# Patient Record
Sex: MALE | Race: WHITE | Employment: FULL TIME | URBAN - METROPOLITAN AREA
[De-identification: names, ages, dates, MRNs, and addresses within clinical notes are randomized per-mention and may not be internally consistent; named-entity substitution may affect disease eponyms.]

---

## 2017-05-11 PROBLEM — Z96.642 HISTORY OF LEFT HIP REPLACEMENT: Status: ACTIVE | Noted: 2017-05-11

## 2017-05-11 PROBLEM — M70.62 TROCHANTERIC BURSITIS, LEFT HIP: Status: ACTIVE | Noted: 2017-05-11

## 2017-06-08 PROBLEM — M25.552 LEFT HIP PAIN: Status: ACTIVE | Noted: 2017-06-08

## 2017-09-01 ENCOUNTER — APPOINTMENT (OUTPATIENT)
Dept: LAB | Facility: HOSPITAL | Age: 57
End: 2017-09-01
Attending: ORTHOPAEDIC SURGERY
Payer: COMMERCIAL

## 2017-09-01 DIAGNOSIS — Z96.642 HISTORY OF LEFT HIP REPLACEMENT: ICD-10-CM

## 2017-09-01 LAB
C-REACTIVE PROTEIN: <0.29 MG/DL (ref ?–1)
SED RATE-ML: 6 MM/HR (ref 0–12)

## 2017-09-01 PROCEDURE — 83015 HEAVY METAL QUAL ANY NUMBER: CPT

## 2017-09-01 PROCEDURE — 86140 C-REACTIVE PROTEIN: CPT

## 2017-09-01 PROCEDURE — 82495 ASSAY OF CHROMIUM: CPT

## 2017-09-01 PROCEDURE — 36415 COLL VENOUS BLD VENIPUNCTURE: CPT

## 2017-09-01 PROCEDURE — 85652 RBC SED RATE AUTOMATED: CPT

## 2017-09-03 LAB — CHROMIUM, SERUM: 3.1 UG/L

## 2017-09-04 LAB — COBALT, SERUM OR PLASMA: 4.1 UG/L

## 2017-09-26 PROBLEM — T84.091A: Status: ACTIVE | Noted: 2017-09-26

## 2017-11-22 RX ORDER — MULTIVITAMIN
1 TABLET ORAL DAILY
Status: ON HOLD | COMMUNITY
End: 2018-01-08

## 2017-12-11 PROBLEM — Z86.39 HISTORY OF ENDOGENOUS HYPERTRIGLYCERIDEMIA: Status: ACTIVE | Noted: 2017-12-11

## 2017-12-19 ENCOUNTER — LABORATORY ENCOUNTER (OUTPATIENT)
Dept: LAB | Facility: HOSPITAL | Age: 57
End: 2017-12-19
Payer: COMMERCIAL

## 2017-12-19 ENCOUNTER — APPOINTMENT (OUTPATIENT)
Dept: LAB | Facility: HOSPITAL | Age: 57
End: 2017-12-19
Payer: COMMERCIAL

## 2017-12-19 ENCOUNTER — HOSPITAL ENCOUNTER (OUTPATIENT)
Dept: PHYSICAL THERAPY | Facility: HOSPITAL | Age: 57
Discharge: HOME | End: 2017-12-19
Attending: ORTHOPAEDIC SURGERY
Payer: COMMERCIAL

## 2017-12-19 DIAGNOSIS — Z12.5 SCREENING PSA (PROSTATE SPECIFIC ANTIGEN): ICD-10-CM

## 2017-12-19 DIAGNOSIS — T84.018A FAILED TOTAL HIP ARTHROPLASTY (HCC): ICD-10-CM

## 2017-12-19 DIAGNOSIS — I10 ESSENTIAL HYPERTENSION: ICD-10-CM

## 2017-12-19 DIAGNOSIS — Z96.649 FAILED TOTAL HIP ARTHROPLASTY (HCC): ICD-10-CM

## 2017-12-19 DIAGNOSIS — R73.01 IFG (IMPAIRED FASTING GLUCOSE): ICD-10-CM

## 2017-12-19 DIAGNOSIS — Z86.39 HISTORY OF ENDOGENOUS HYPERTRIGLYCERIDEMIA: ICD-10-CM

## 2017-12-19 PROCEDURE — 85730 THROMBOPLASTIN TIME PARTIAL: CPT

## 2017-12-19 PROCEDURE — 81003 URINALYSIS AUTO W/O SCOPE: CPT

## 2017-12-19 PROCEDURE — 87081 CULTURE SCREEN ONLY: CPT

## 2017-12-19 PROCEDURE — 85610 PROTHROMBIN TIME: CPT

## 2017-12-19 PROCEDURE — 93010 ELECTROCARDIOGRAM REPORT: CPT | Performed by: INTERNAL MEDICINE

## 2017-12-19 PROCEDURE — 83036 HEMOGLOBIN GLYCOSYLATED A1C: CPT

## 2017-12-19 PROCEDURE — 36415 COLL VENOUS BLD VENIPUNCTURE: CPT

## 2017-12-19 PROCEDURE — 80061 LIPID PANEL: CPT

## 2017-12-19 PROCEDURE — 84153 ASSAY OF PSA TOTAL: CPT

## 2017-12-19 PROCEDURE — 86901 BLOOD TYPING SEROLOGIC RH(D): CPT

## 2017-12-19 PROCEDURE — 85025 COMPLETE CBC W/AUTO DIFF WBC: CPT

## 2017-12-19 PROCEDURE — 86900 BLOOD TYPING SEROLOGIC ABO: CPT

## 2017-12-19 PROCEDURE — 84550 ASSAY OF BLOOD/URIC ACID: CPT

## 2017-12-19 PROCEDURE — 86850 RBC ANTIBODY SCREEN: CPT

## 2017-12-19 PROCEDURE — 80053 COMPREHEN METABOLIC PANEL: CPT

## 2017-12-19 PROCEDURE — 93005 ELECTROCARDIOGRAM TRACING: CPT

## 2017-12-21 ENCOUNTER — ANESTHESIA EVENT (OUTPATIENT)
Dept: SURGERY | Facility: HOSPITAL | Age: 57
DRG: 468 | End: 2017-12-21
Payer: COMMERCIAL

## 2017-12-21 NOTE — PROGRESS NOTES
Results released to [facility] with Dr. Sparks Home notes  Pt sent [facility] message of Dr. Sparks Home notes

## 2018-01-08 ENCOUNTER — APPOINTMENT (OUTPATIENT)
Dept: GENERAL RADIOLOGY | Facility: HOSPITAL | Age: 58
DRG: 468 | End: 2018-01-08
Attending: PHYSICIAN ASSISTANT
Payer: COMMERCIAL

## 2018-01-08 ENCOUNTER — APPOINTMENT (OUTPATIENT)
Dept: GENERAL RADIOLOGY | Facility: HOSPITAL | Age: 58
DRG: 468 | End: 2018-01-08
Attending: ORTHOPAEDIC SURGERY
Payer: COMMERCIAL

## 2018-01-08 ENCOUNTER — ANESTHESIA (OUTPATIENT)
Dept: SURGERY | Facility: HOSPITAL | Age: 58
DRG: 468 | End: 2018-01-08
Payer: COMMERCIAL

## 2018-01-08 ENCOUNTER — SURGERY (OUTPATIENT)
Age: 58
End: 2018-01-08

## 2018-01-08 ENCOUNTER — HOSPITAL ENCOUNTER (INPATIENT)
Facility: HOSPITAL | Age: 58
LOS: 2 days | Discharge: HOME | DRG: 468 | End: 2018-01-10
Attending: ORTHOPAEDIC SURGERY | Admitting: ORTHOPAEDIC SURGERY
Payer: COMMERCIAL

## 2018-01-08 DIAGNOSIS — T84.018A FAILED TOTAL HIP ARTHROPLASTY (HCC): Primary | ICD-10-CM

## 2018-01-08 DIAGNOSIS — Z96.649 FAILED TOTAL HIP ARTHROPLASTY (HCC): Primary | ICD-10-CM

## 2018-01-08 PROCEDURE — 73501 X-RAY EXAM HIP UNI 1 VIEW: CPT | Performed by: ORTHOPAEDIC SURGERY

## 2018-01-08 PROCEDURE — 73501 X-RAY EXAM HIP UNI 1 VIEW: CPT | Performed by: PHYSICIAN ASSISTANT

## 2018-01-08 PROCEDURE — 88300 SURGICAL PATH GROSS: CPT | Performed by: ORTHOPAEDIC SURGERY

## 2018-01-08 PROCEDURE — 0SPB09Z REMOVAL OF LINER FROM LEFT HIP JOINT, OPEN APPROACH: ICD-10-PCS | Performed by: ORTHOPAEDIC SURGERY

## 2018-01-08 PROCEDURE — 0SRB04A REPLACEMENT OF LEFT HIP JOINT WITH CERAMIC ON POLYETHYLENE SYNTHETIC SUBSTITUTE, UNCEMENTED, OPEN APPROACH: ICD-10-PCS | Performed by: ORTHOPAEDIC SURGERY

## 2018-01-08 PROCEDURE — 88305 TISSUE EXAM BY PATHOLOGIST: CPT | Performed by: ORTHOPAEDIC SURGERY

## 2018-01-08 PROCEDURE — 3E0T3BZ INTRODUCTION OF ANESTHETIC AGENT INTO PERIPHERAL NERVES AND PLEXI, PERCUTANEOUS APPROACH: ICD-10-PCS | Performed by: ANESTHESIOLOGY

## 2018-01-08 PROCEDURE — 0SUE09Z SUPPLEMENT LEFT HIP JOINT, ACETABULAR SURFACE WITH LINER, OPEN APPROACH: ICD-10-PCS | Performed by: ORTHOPAEDIC SURGERY

## 2018-01-08 PROCEDURE — 76942 ECHO GUIDE FOR BIOPSY: CPT | Performed by: ORTHOPAEDIC SURGERY

## 2018-01-08 DEVICE — WAGNER CONE PROSTHESIS 135°, Ø 18
Type: IMPLANTABLE DEVICE | Site: HIP | Status: FUNCTIONAL
Brand: WAGNER CONE PROSTHESIS®

## 2018-01-08 DEVICE — BIOLOX® DELTA, CERAMIC FEMORAL HEAD, S, Ø 36/-3.5, TAPER 12/14
Type: IMPLANTABLE DEVICE | Site: HIP | Status: FUNCTIONAL
Brand: BIOLOX® DELTA

## 2018-01-08 DEVICE — IMPLANTABLE DEVICE: Type: IMPLANTABLE DEVICE | Site: HIP | Status: FUNCTIONAL

## 2018-01-08 DEVICE — CONT MULTI SHELL 56 KK: Type: IMPLANTABLE DEVICE | Site: HIP | Status: FUNCTIONAL

## 2018-01-08 RX ORDER — METOCLOPRAMIDE HYDROCHLORIDE 5 MG/ML
10 INJECTION INTRAMUSCULAR; INTRAVENOUS AS NEEDED
Status: DISCONTINUED | OUTPATIENT
Start: 2018-01-08 | End: 2018-01-08 | Stop reason: HOSPADM

## 2018-01-08 RX ORDER — BISACODYL 10 MG
10 SUPPOSITORY, RECTAL RECTAL
Status: DISCONTINUED | OUTPATIENT
Start: 2018-01-08 | End: 2018-01-10

## 2018-01-08 RX ORDER — HYDROCODONE BITARTRATE AND ACETAMINOPHEN 5; 325 MG/1; MG/1
1 TABLET ORAL AS NEEDED
Status: DISCONTINUED | OUTPATIENT
Start: 2018-01-08 | End: 2018-01-08 | Stop reason: HOSPADM

## 2018-01-08 RX ORDER — OXYCODONE HCL 10 MG/1
10 TABLET, FILM COATED, EXTENDED RELEASE ORAL
Status: DISCONTINUED | OUTPATIENT
Start: 2018-01-08 | End: 2018-01-09

## 2018-01-08 RX ORDER — METOCLOPRAMIDE HYDROCHLORIDE 5 MG/ML
10 INJECTION INTRAMUSCULAR; INTRAVENOUS EVERY 6 HOURS PRN
Status: DISCONTINUED | OUTPATIENT
Start: 2018-01-08 | End: 2018-01-10

## 2018-01-08 RX ORDER — DOCUSATE SODIUM 100 MG/1
100 CAPSULE, LIQUID FILLED ORAL 2 TIMES DAILY
Qty: 60 CAPSULE | Refills: 0 | Status: SHIPPED | OUTPATIENT
Start: 2018-01-08 | End: 2018-02-23 | Stop reason: ALTCHOICE

## 2018-01-08 RX ORDER — KETOROLAC TROMETHAMINE 30 MG/ML
15 INJECTION, SOLUTION INTRAMUSCULAR; INTRAVENOUS EVERY 6 HOURS
Status: COMPLETED | OUTPATIENT
Start: 2018-01-08 | End: 2018-01-09

## 2018-01-08 RX ORDER — DOCUSATE SODIUM 100 MG/1
100 CAPSULE, LIQUID FILLED ORAL 2 TIMES DAILY
Status: DISCONTINUED | OUTPATIENT
Start: 2018-01-08 | End: 2018-01-10

## 2018-01-08 RX ORDER — CEFAZOLIN SODIUM 1 G/3ML
INJECTION, POWDER, FOR SOLUTION INTRAMUSCULAR; INTRAVENOUS
Status: DISCONTINUED | OUTPATIENT
Start: 2018-01-08 | End: 2018-01-08 | Stop reason: HOSPADM

## 2018-01-08 RX ORDER — MIDAZOLAM HYDROCHLORIDE 1 MG/ML
1 INJECTION INTRAMUSCULAR; INTRAVENOUS EVERY 5 MIN PRN
Status: DISCONTINUED | OUTPATIENT
Start: 2018-01-08 | End: 2018-01-08 | Stop reason: HOSPADM

## 2018-01-08 RX ORDER — ACETAMINOPHEN 325 MG/1
650 TABLET ORAL ONCE
Status: COMPLETED | OUTPATIENT
Start: 2018-01-08 | End: 2018-01-08

## 2018-01-08 RX ORDER — OXYCODONE HYDROCHLORIDE 10 MG/1
10 TABLET ORAL EVERY 4 HOURS PRN
Status: DISCONTINUED | OUTPATIENT
Start: 2018-01-08 | End: 2018-01-09

## 2018-01-08 RX ORDER — HYDROMORPHONE HYDROCHLORIDE 1 MG/ML
0.2 INJECTION, SOLUTION INTRAMUSCULAR; INTRAVENOUS; SUBCUTANEOUS EVERY 2 HOUR PRN
Status: DISCONTINUED | OUTPATIENT
Start: 2018-01-08 | End: 2018-01-10

## 2018-01-08 RX ORDER — IBUPROFEN 800 MG/1
800 TABLET ORAL EVERY 6 HOURS PRN
Status: ON HOLD | COMMUNITY
End: 2018-01-08

## 2018-01-08 RX ORDER — OXYCODONE HCL 10 MG/1
10 TABLET, FILM COATED, EXTENDED RELEASE ORAL
Status: COMPLETED | OUTPATIENT
Start: 2018-01-08 | End: 2018-01-08

## 2018-01-08 RX ORDER — OXYCODONE HYDROCHLORIDE 5 MG/1
5 TABLET ORAL EVERY 4 HOURS PRN
Status: DISCONTINUED | OUTPATIENT
Start: 2018-01-08 | End: 2018-01-09

## 2018-01-08 RX ORDER — HYDROCODONE BITARTRATE AND ACETAMINOPHEN 5; 325 MG/1; MG/1
2 TABLET ORAL AS NEEDED
Status: DISCONTINUED | OUTPATIENT
Start: 2018-01-08 | End: 2018-01-08 | Stop reason: HOSPADM

## 2018-01-08 RX ORDER — DIPHENHYDRAMINE HCL 25 MG
25 CAPSULE ORAL EVERY 4 HOURS PRN
Status: DISCONTINUED | OUTPATIENT
Start: 2018-01-08 | End: 2018-01-10

## 2018-01-08 RX ORDER — CEFAZOLIN SODIUM/WATER 2 G/20 ML
2 SYRINGE (ML) INTRAVENOUS ONCE
Status: DISCONTINUED | OUTPATIENT
Start: 2018-01-08 | End: 2018-01-08 | Stop reason: HOSPADM

## 2018-01-08 RX ORDER — SODIUM CHLORIDE 9 MG/ML
INJECTION, SOLUTION INTRAVENOUS CONTINUOUS
Status: DISCONTINUED | OUTPATIENT
Start: 2018-01-08 | End: 2018-01-10

## 2018-01-08 RX ORDER — HYDROMORPHONE HYDROCHLORIDE 1 MG/ML
0.4 INJECTION, SOLUTION INTRAMUSCULAR; INTRAVENOUS; SUBCUTANEOUS EVERY 5 MIN PRN
Status: DISCONTINUED | OUTPATIENT
Start: 2018-01-08 | End: 2018-01-08 | Stop reason: HOSPADM

## 2018-01-08 RX ORDER — SODIUM CHLORIDE, SODIUM LACTATE, POTASSIUM CHLORIDE, CALCIUM CHLORIDE 600; 310; 30; 20 MG/100ML; MG/100ML; MG/100ML; MG/100ML
INJECTION, SOLUTION INTRAVENOUS CONTINUOUS
Status: DISCONTINUED | OUTPATIENT
Start: 2018-01-08 | End: 2018-01-08

## 2018-01-08 RX ORDER — SODIUM PHOSPHATE, DIBASIC AND SODIUM PHOSPHATE, MONOBASIC 7; 19 G/133ML; G/133ML
1 ENEMA RECTAL ONCE AS NEEDED
Status: DISCONTINUED | OUTPATIENT
Start: 2018-01-08 | End: 2018-01-10

## 2018-01-08 RX ORDER — DIPHENHYDRAMINE HYDROCHLORIDE 50 MG/ML
25 INJECTION INTRAMUSCULAR; INTRAVENOUS ONCE AS NEEDED
Status: ACTIVE | OUTPATIENT
Start: 2018-01-08 | End: 2018-01-08

## 2018-01-08 RX ORDER — CEFAZOLIN SODIUM/WATER 2 G/20 ML
2 SYRINGE (ML) INTRAVENOUS EVERY 8 HOURS
Status: COMPLETED | OUTPATIENT
Start: 2018-01-08 | End: 2018-01-09

## 2018-01-08 RX ORDER — ONDANSETRON 2 MG/ML
4 INJECTION INTRAMUSCULAR; INTRAVENOUS AS NEEDED
Status: DISCONTINUED | OUTPATIENT
Start: 2018-01-08 | End: 2018-01-08 | Stop reason: HOSPADM

## 2018-01-08 RX ORDER — CYCLOBENZAPRINE HCL 5 MG
5 TABLET ORAL 3 TIMES DAILY PRN
Status: DISCONTINUED | OUTPATIENT
Start: 2018-01-08 | End: 2018-01-10

## 2018-01-08 RX ORDER — DIPHENHYDRAMINE HYDROCHLORIDE 50 MG/ML
12.5 INJECTION INTRAMUSCULAR; INTRAVENOUS EVERY 4 HOURS PRN
Status: DISCONTINUED | OUTPATIENT
Start: 2018-01-08 | End: 2018-01-10

## 2018-01-08 RX ORDER — HYDROMORPHONE HYDROCHLORIDE 1 MG/ML
0.8 INJECTION, SOLUTION INTRAMUSCULAR; INTRAVENOUS; SUBCUTANEOUS EVERY 2 HOUR PRN
Status: DISCONTINUED | OUTPATIENT
Start: 2018-01-08 | End: 2018-01-10

## 2018-01-08 RX ORDER — ONDANSETRON 2 MG/ML
4 INJECTION INTRAMUSCULAR; INTRAVENOUS EVERY 4 HOURS PRN
Status: DISCONTINUED | OUTPATIENT
Start: 2018-01-08 | End: 2018-01-10

## 2018-01-08 RX ORDER — SENNOSIDES 8.6 MG
17.2 TABLET ORAL NIGHTLY
Status: DISCONTINUED | OUTPATIENT
Start: 2018-01-08 | End: 2018-01-10

## 2018-01-08 RX ORDER — ALBUTEROL SULFATE 2.5 MG/3ML
2.5 SOLUTION RESPIRATORY (INHALATION) ONCE AS NEEDED
Status: DISCONTINUED | OUTPATIENT
Start: 2018-01-08 | End: 2018-01-08 | Stop reason: HOSPADM

## 2018-01-08 RX ORDER — HYDROMORPHONE HYDROCHLORIDE 1 MG/ML
0.4 INJECTION, SOLUTION INTRAMUSCULAR; INTRAVENOUS; SUBCUTANEOUS EVERY 2 HOUR PRN
Status: DISCONTINUED | OUTPATIENT
Start: 2018-01-08 | End: 2018-01-10

## 2018-01-08 RX ORDER — SCOLOPAMINE TRANSDERMAL SYSTEM 1 MG/1
1 PATCH, EXTENDED RELEASE TRANSDERMAL ONCE
Status: DISCONTINUED | OUTPATIENT
Start: 2018-01-08 | End: 2018-01-10

## 2018-01-08 RX ORDER — OXYCODONE HYDROCHLORIDE 15 MG/1
15 TABLET ORAL EVERY 4 HOURS PRN
Status: DISCONTINUED | OUTPATIENT
Start: 2018-01-08 | End: 2018-01-09

## 2018-01-08 RX ORDER — ACETAMINOPHEN 325 MG/1
650 TABLET ORAL 4 TIMES DAILY
Status: DISCONTINUED | OUTPATIENT
Start: 2018-01-08 | End: 2018-01-09

## 2018-01-08 RX ORDER — TRAMADOL HYDROCHLORIDE 50 MG/1
50 TABLET ORAL EVERY 6 HOURS
Status: DISCONTINUED | OUTPATIENT
Start: 2018-01-08 | End: 2018-01-09

## 2018-01-08 RX ORDER — POLYETHYLENE GLYCOL 3350 17 G/17G
17 POWDER, FOR SOLUTION ORAL DAILY PRN
Status: DISCONTINUED | OUTPATIENT
Start: 2018-01-08 | End: 2018-01-10

## 2018-01-08 RX ORDER — NALOXONE HYDROCHLORIDE 0.4 MG/ML
80 INJECTION, SOLUTION INTRAMUSCULAR; INTRAVENOUS; SUBCUTANEOUS AS NEEDED
Status: DISCONTINUED | OUTPATIENT
Start: 2018-01-08 | End: 2018-01-08 | Stop reason: HOSPADM

## 2018-01-08 RX ORDER — MEPERIDINE HYDROCHLORIDE 25 MG/ML
12.5 INJECTION INTRAMUSCULAR; INTRAVENOUS; SUBCUTANEOUS AS NEEDED
Status: DISCONTINUED | OUTPATIENT
Start: 2018-01-08 | End: 2018-01-08 | Stop reason: HOSPADM

## 2018-01-08 RX ORDER — ACETAMINOPHEN 325 MG/1
TABLET ORAL
Status: COMPLETED
Start: 2018-01-08 | End: 2018-01-08

## 2018-01-08 RX ORDER — SODIUM CHLORIDE, SODIUM LACTATE, POTASSIUM CHLORIDE, CALCIUM CHLORIDE 600; 310; 30; 20 MG/100ML; MG/100ML; MG/100ML; MG/100ML
INJECTION, SOLUTION INTRAVENOUS CONTINUOUS
Status: DISCONTINUED | OUTPATIENT
Start: 2018-01-08 | End: 2018-01-08 | Stop reason: HOSPADM

## 2018-01-08 RX ORDER — HYDROCODONE BITARTRATE AND ACETAMINOPHEN 10; 325 MG/1; MG/1
1 TABLET ORAL EVERY 4 HOURS PRN
Qty: 80 TABLET | Refills: 0 | Status: SHIPPED | OUTPATIENT
Start: 2018-01-08 | End: 2018-02-23 | Stop reason: ALTCHOICE

## 2018-01-08 NOTE — ANESTHESIA PREPROCEDURE EVALUATION
PRE-OP EVALUATION    Patient Name: Bell Jones    Pre-op Diagnosis: LEFT HIP FAILED HIP REPLACEMENT      Procedure(s):  LEFT REVISION TOTAL HIP REPLACEMENT      Surgeon(s) and Role:     Donya Plascencia MD - Primary    Pre-op vitals reviewed.   Temp: Cardiovascular  Comment: Sinus bradycardia  Incomplete right bundle branch block  Minimal voltage criteria for LVH, may be normal variant  Borderline ECG  No previous ECGs available          Exercise tolerance: good     MET: >4 Dental  Comment: Dentition is grossly intact; Patient does not demonstrate loose teeth to inspection. No notable dental history. Pulmonary    Pulmonary exam normal.  Breath sounds clear to auscultation bilaterally.                Other findings

## 2018-01-08 NOTE — BRIEF OP NOTE
Pre-Operative Diagnosis: LEFT HIP FAILED HIP RESURFACING      Post-Operative Diagnosis: SAME      Procedure Performed:   Procedure(s):  LEFT REVISION TOTAL HIP REPLACEMENT      Surgeon(s) and Role:     Jennifer Oreilly MD - Primary    Assistant(s):  Alan Delcid

## 2018-01-08 NOTE — ANESTHESIA POSTPROCEDURE EVALUATION
[facility] Patient Status:  Surgery Admit   Age/Gender 61year old male MRN [id number]   Location [address] [facility] Attending Jay Valdivia MD   Hosp Day # 0 PCP Cheryl Gay MD       Anesthesia Post-o

## 2018-01-08 NOTE — H&P
Yissel Farrell   12/11/2017 8:45 AM   Office Visit   MRN:  [id number]   Description: 61year old male Provider: Robyn Balderrama MD Department: [facility]   Scanning Cover Sheet     Click to print Nadja Circe 649 for scanning Ibuprofen (MOTRIN OR)   Disp:  Rfl:        No Known Allergies        Past Medical History:   Diagnosis Date   • Anesthesia complication       2 hours after surgery, lost consciousness  per pt secondary to hypovolemia?    • Essential hypertension     • Hyper HEMATOLOGY: denies hx anemia, easy bruising/bleeding or clotting disorder  ENDOCRINE: +cold intolerance, denies increased thirst/hunger/sweating or urination     EXAM:   /66   Pulse 76   Temp (!) 97 °F (36.1 °C) (Tympanic)   Resp 16   Ht 68\"   Wt 18 -     URINALYSIS WITH CULTURE REFLEX; Future  B-blocker added for perioperative cardioprotection        Screening PSA (prostate specific antigen)  -     PSA; Future     History of endogenous hypertriglyceridemia  -     LIPID PANEL;  Future     IFG (impaired · You may be told to take antibiotics just before surgery to prevent infection. If so, follow instructions carefully on how to take them.   · If you are told to take blood thinners to help prevent blood clots after surgery, be sure to follow the instruction Electronically signed by Justina Oseguera          Instructions         Return if symptoms worsen or fail to improve.    Patient Instructions                After Visit Summary (Printed 12/11/2017)   Additional Documentation     Vitals:    /66    Pulse

## 2018-01-09 PROBLEM — Z96.649 FAILED TOTAL HIP ARTHROPLASTY (HCC): Status: ACTIVE | Noted: 2017-09-26

## 2018-01-09 PROBLEM — T84.018A FAILED TOTAL HIP ARTHROPLASTY (HCC): Status: ACTIVE | Noted: 2017-09-26

## 2018-01-09 LAB
BUN BLD-MCNC: 13 MG/DL (ref 8–20)
CALCIUM BLD-MCNC: 8.3 MG/DL (ref 8.3–10.3)
CHLORIDE: 104 MMOL/L (ref 101–111)
CO2: 27 MMOL/L (ref 22–32)
CREAT BLD-MCNC: 1.01 MG/DL (ref 0.7–1.3)
ERYTHROCYTE [DISTWIDTH] IN BLOOD BY AUTOMATED COUNT: 12.5 % (ref 11.5–16)
GLUCOSE BLD-MCNC: 103 MG/DL (ref 70–99)
HCT VFR BLD AUTO: 32.7 % (ref 37–53)
HGB BLD-MCNC: 11 G/DL (ref 13–17)
MCH RBC QN AUTO: 30.9 PG (ref 27–33.2)
MCHC RBC AUTO-ENTMCNC: 33.6 G/DL (ref 31–37)
MCV RBC AUTO: 91.9 FL (ref 80–99)
PLATELET # BLD AUTO: 196 10(3)UL (ref 150–450)
POTASSIUM SERPL-SCNC: 4.3 MMOL/L (ref 3.6–5.1)
RBC # BLD AUTO: 3.56 X10(6)UL (ref 4.3–5.7)
RED CELL DISTRIBUTION WIDTH-SD: 42.4 FL (ref 35.1–46.3)
SODIUM SERPL-SCNC: 138 MMOL/L (ref 136–144)
WBC # BLD AUTO: 10.3 X10(3) UL (ref 4–13)

## 2018-01-09 PROCEDURE — 97535 SELF CARE MNGMENT TRAINING: CPT

## 2018-01-09 PROCEDURE — 80048 BASIC METABOLIC PNL TOTAL CA: CPT | Performed by: HOSPITALIST

## 2018-01-09 PROCEDURE — 97165 OT EVAL LOW COMPLEX 30 MIN: CPT

## 2018-01-09 PROCEDURE — 85027 COMPLETE CBC AUTOMATED: CPT | Performed by: ORTHOPAEDIC SURGERY

## 2018-01-09 PROCEDURE — 97116 GAIT TRAINING THERAPY: CPT

## 2018-01-09 PROCEDURE — 97150 GROUP THERAPEUTIC PROCEDURES: CPT

## 2018-01-09 PROCEDURE — 97161 PT EVAL LOW COMPLEX 20 MIN: CPT

## 2018-01-09 RX ORDER — ATENOLOL 25 MG/1
25 TABLET ORAL DAILY
Status: DISCONTINUED | OUTPATIENT
Start: 2018-01-09 | End: 2018-01-10

## 2018-01-09 RX ORDER — HYDROCODONE BITARTRATE AND ACETAMINOPHEN 10; 325 MG/1; MG/1
2 TABLET ORAL EVERY 4 HOURS PRN
Status: DISCONTINUED | OUTPATIENT
Start: 2018-01-09 | End: 2018-01-10

## 2018-01-09 RX ORDER — NALOXONE HYDROCHLORIDE 4 MG/.1ML
4 SPRAY, METERED NASAL AS NEEDED
Qty: 1 EACH | Refills: 0 | Status: SHIPPED | OUTPATIENT
Start: 2018-01-09 | End: 2018-02-23 | Stop reason: ALTCHOICE

## 2018-01-09 RX ORDER — CALCIUM CARBONATE 200(500)MG
500 TABLET,CHEWABLE ORAL 3 TIMES DAILY PRN
Status: DISCONTINUED | OUTPATIENT
Start: 2018-01-09 | End: 2018-01-10

## 2018-01-09 RX ORDER — HYDROCODONE BITARTRATE AND ACETAMINOPHEN 10; 325 MG/1; MG/1
1 TABLET ORAL EVERY 4 HOURS PRN
Status: DISCONTINUED | OUTPATIENT
Start: 2018-01-09 | End: 2018-01-10

## 2018-01-09 RX ORDER — ACETAMINOPHEN 325 MG/1
650 TABLET ORAL EVERY 4 HOURS PRN
Status: DISCONTINUED | OUTPATIENT
Start: 2018-01-09 | End: 2018-01-10

## 2018-01-09 RX ORDER — LISINOPRIL 10 MG/1
10 TABLET ORAL DAILY
Status: DISCONTINUED | OUTPATIENT
Start: 2018-01-09 | End: 2018-01-10

## 2018-01-09 RX ORDER — TRAMADOL HYDROCHLORIDE 50 MG/1
50 TABLET ORAL EVERY 6 HOURS PRN
Status: DISCONTINUED | OUTPATIENT
Start: 2018-01-09 | End: 2018-01-10

## 2018-01-09 RX ORDER — CELECOXIB 200 MG/1
200 CAPSULE ORAL 2 TIMES DAILY
Status: COMPLETED | OUTPATIENT
Start: 2018-01-09 | End: 2018-01-10

## 2018-01-09 NOTE — CONSULTS
General Medicine Consult      Reason for consult: medical management    Consulted by: Dr. Mcguire Current    PCP: Dioni Mann MD      History of Present Illness: Pt is a 60 yo with HTN, OA, who is consulted for medical management s/p L revision THR.   No post op HCl  50 mg Oral Q6H   • acetaminophen  650 mg Oral QID   • OxyCODONE HCl ER  10 mg Oral [email or web address]   • ketorolac (TORADOL) injection  15 mg Intravenous Q6H   • Senna  17.2 mg Oral Nightly   • docusate sodium  100 mg Oral BID   • rivaroxaban  10 mg Oral effort   Chest wall:  No tenderness or deformity. Heart:  Regular rate and rhythm, S1, S2 normal,  no LE edema   Abdomen:   Soft, non-tender.  Bowel sounds normal. Non distended, no overt hernias   Extremities: Extremities with no cyanosis   Skin: Skin co

## 2018-01-09 NOTE — CM/SW NOTE
Pt confirmed he will discharge to home, plans to go to outpatient therapy at [facility] in [facility]. No other needs.

## 2018-01-09 NOTE — PHYSICAL THERAPY NOTE
PHYSICAL THERAPY HIP TREATMENT NOTE - INPATIENT      Room Number: 652/390-V     Session: 1&2  Number of Visits to Meet Established Goals: 3    Presenting Problem: s/p left ROBERTA revision 1/8/18     History related to current admission: pt admitted 1/9/18 for sitting on the side of the bed?: None   How much help from another person does the patient currently need. ..   -   Moving to and from a bed to a chair (including a wheelchair)?: None   -   Need to walk in hospital room?: None   -   Climbing 3-5 steps with Session PM Session   Ankle Pumps     10 reps 15 reps   Quad Sets 10 reps 15 reps   Glut Sets 10 reps 15 reps   Hip Abd/Add 10 reps 15 reps   Heel slides 10 reps 15 reps   Saq 10 reps 15 reps   Sitting Knee Extension 10 reps 15 reps   Standing heel/toe rais Patient verbalizes and/or demonstrates all precautions and safety concerns independently   Goal #6        Goal Comments: Goals established on 1/9/2018. Ongoing 1/9/18.

## 2018-01-09 NOTE — PHYSICAL THERAPY NOTE
PHYSICAL THERAPY HIP EVALUATION - INPATIENT     Room Number: 351/351-A  Evaluation Date: 1/9/2018  Type of Evaluation: Initial  Physician Order: PT Eval and Treat    Presenting Problem: s/p left ROBERTA revision 1/8/18  Reason for Therapy: Mobility Dysfunction Activity promotion    COGNITION  · Overall Cognitive Status:  WFL - within functional limits    RANGE OF MOTION AND STRENGTH ASSESSMENT  Upper extremity ROM and strength are within functional limits     Lower extremity ROM is within functional limits excep demonstration of rw use, stood to rw with supervision. Pt able to perform left tke, marching in place with no left knee buckling, instructed in gait sequencing. Pt amb with rw with gait training emphasis on heel toe pattern, upright posture.  Pt does tend Home;Outpatient PT    PLAN  PT Treatment Plan: Bed mobility; Endurance; Patient education; Energy conservation;Gait training;Strengthening;Range of motion;Stoop training;Stair training;Transfer training  Rehab Potential : Good  Frequency (Obs): BID  Number of

## 2018-01-09 NOTE — PROGRESS NOTES
Brief Internal Medicine Note    Full Note to Follow      Pt is a 62 yo with HTN, OA, who is consulted for medical management s/p L revision THR. No post op complications noted. No cp/sob/n/v/f/c. +U, +flatus.   Some LH upon standing but resolves fast.

## 2018-01-09 NOTE — OCCUPATIONAL THERAPY NOTE
OCCUPATIONAL THERAPY QUICK EVALUATION - INPATIENT    Room Number: 351/351-A  Evaluation Date: 1/9/2018     Type of Evaluation: Quick Eval  Presenting Problem: s/p L ROBERTA revision 1/8/18    Physician Order: IP Consult to Occupational Therapy  Reason for Ther BEARING RESTRICTION  Weight Bearing Restriction: L lower extremity           L Lower Extremity: Weight Bearing as Tolerated    PAIN ASSESSMENT  Ratin  Location: L hip  Management Techniques: Activity promotion; Body mechanics;Breathing techniques;Relaxa dressing Mod I. Patient required minimal cueing for hand placement during transfers throughout. Patient also educated on OT role, safety, fall prevention, pain management, shower transfers with good verbal understanding.      Patient End of Session: Up in c home  Patient able to dress lower extremities:  At supervision level or above; patient reports will have supervision at home  Patient/Caregiver able to demonstrate safety with ADLS: At supervision level or above; patient reports will have supervision at [facility]

## 2018-01-09 NOTE — PROGRESS NOTES
[facility] Patient Status:  Inpatient    1960 MRN [id number]   [facility] 3SW-A Attending Alok Jorgensen MD   Hosp Day # 1 PCP Adrienne Castillo MD         S:  Patient doing well. No nausea.   No SOB, CP or reina

## 2018-01-09 NOTE — OPERATIVE REPORT
[facility]    PATIENT'S NAME: Fiorella Nowak   ATTENDING PHYSICIAN: Adrián Yeager M.D. OPERATING PHYSICIAN: Adrián Yeager M.D.    PATIENT ACCOUNT#:   [de-identified]    LOCATION:  PACU [facility] PACU 3 EDWP 10  MEDICAL RECORD #:   [id number]       DATE OF BIRTH padding was also applied. Using the old incision, I used the old incision in the inferior half, and then I did a step-cut anteriorly, since my approach was going to be a lateral approach. Sharp dissection through the fascial layer was made.   Gluteus medi and thick and healthy. Medial wall was slightly compromised and soft, but it was still intact. Roof was intact. I could put my finger along the iliopsoas area, and there was a lining and a space. There did not return any significant amount of fluid.   A Subcutaneous layer was closed in multiple layers. Skin was closed with staples. Sterile dressing was applied. All counts were correct.     Dictated By Sheldon Charles M.D.  d: 01/08/2018 15:00:10  t: 01/08/2018 15:36:09  [facility] [id number]  /

## 2018-01-10 VITALS
HEIGHT: 67 IN | WEIGHT: 180.75 LBS | OXYGEN SATURATION: 96 % | HEART RATE: 62 BPM | RESPIRATION RATE: 18 BRPM | BODY MASS INDEX: 28.37 KG/M2 | TEMPERATURE: 98 F | DIASTOLIC BLOOD PRESSURE: 62 MMHG | SYSTOLIC BLOOD PRESSURE: 121 MMHG

## 2018-01-10 LAB
BASOPHILS # BLD AUTO: 0.03 X10(3) UL (ref 0–0.1)
BASOPHILS NFR BLD AUTO: 0.3 %
EOSINOPHIL # BLD AUTO: 0.13 X10(3) UL (ref 0–0.3)
EOSINOPHIL NFR BLD AUTO: 1.5 %
ERYTHROCYTE [DISTWIDTH] IN BLOOD BY AUTOMATED COUNT: 12.6 % (ref 11.5–16)
HCT VFR BLD AUTO: 29.6 % (ref 37–53)
HGB BLD-MCNC: 10.3 G/DL (ref 13–17)
IMMATURE GRANULOCYTE COUNT: 0.03 X10(3) UL (ref 0–1)
IMMATURE GRANULOCYTE RATIO %: 0.3 %
LYMPHOCYTES # BLD AUTO: 1.87 X10(3) UL (ref 0.9–4)
LYMPHOCYTES NFR BLD AUTO: 21 %
MCH RBC QN AUTO: 31.1 PG (ref 27–33.2)
MCHC RBC AUTO-ENTMCNC: 34.8 G/DL (ref 31–37)
MCV RBC AUTO: 89.4 FL (ref 80–99)
MONOCYTES # BLD AUTO: 0.86 X10(3) UL (ref 0.1–0.6)
MONOCYTES NFR BLD AUTO: 9.6 %
NEUTROPHIL ABS PRELIM: 6 X10 (3) UL (ref 1.3–6.7)
NEUTROPHILS # BLD AUTO: 6 X10(3) UL (ref 1.3–6.7)
NEUTROPHILS NFR BLD AUTO: 67.3 %
PLATELET # BLD AUTO: 188 10(3)UL (ref 150–450)
RBC # BLD AUTO: 3.31 X10(6)UL (ref 4.3–5.7)
RED CELL DISTRIBUTION WIDTH-SD: 41.6 FL (ref 35.1–46.3)
WBC # BLD AUTO: 8.9 X10(3) UL (ref 4–13)

## 2018-01-10 PROCEDURE — 85025 COMPLETE CBC W/AUTO DIFF WBC: CPT | Performed by: HOSPITALIST

## 2018-01-10 PROCEDURE — 97116 GAIT TRAINING THERAPY: CPT

## 2018-01-10 PROCEDURE — 97150 GROUP THERAPEUTIC PROCEDURES: CPT

## 2018-01-10 NOTE — PROGRESS NOTES
Orthopedic surgery progress note    Kateryna Ronquillo Patient Status:  Inpatient    1960 MRN [id number]   [facility] 3SW-A Attending China Rodriguez MD   Hosp Day # 2 PCP Rao Swain MD       Subjective:  No major complaints.   No

## 2018-01-10 NOTE — CM/SW NOTE
01/10/18 1315   Discharge disposition   Discharged to: Home or Self   Outpatient services Physical therapy   Discharge transportation Private car

## 2018-01-10 NOTE — PROGRESS NOTES
Acute Pain Service    Post Op Day 2 Ortho Note    Assessed patient in chair. Patient states Mikey Bray is working well to manage pain; denies itching/nausea/dizziness. Patient able to bear weight on sx leg; equal sensation in BLE. No further recommendations.

## 2018-01-10 NOTE — PHYSICAL THERAPY NOTE
PHYSICAL THERAPY HIP TREATMENT NOTE - INPATIENT      Room Number: 200/587-B     Session: 3  Number of Visits to Meet Established Goals: 3    Presenting Problem: s/p left ROBERTA revision 1/8/18     History related to current admission: pt admitted 1/9/18 for r sitting on the side of the bed?: None   How much help from another person does the patient currently need. ..   -   Moving to and from a bed to a chair (including a wheelchair)?: None   -   Need to walk in hospital room?: None   -   Climbing 3-5 steps with reach    ASSESSMENT     The patient seen in PT for gait training, transfer training, bed mobility and exercises for strength and flexibility S/P l ROBERTA revision on 1/8/18. Pt demonstrates good progress in mobility, strength and ROM.  Pt was educated on post

## 2018-01-10 NOTE — PROGRESS NOTES
[facility] Hospitalist Progress Note     PCP: Katherine Giang MD    CC:  Follow up    SUBJECTIVE:  Pt sitting up in chair, wife at bedside. Pain controlled.  No cp/sob/n/v/f/c. +U    OBJECTIVE:  Temp:  [98.2 °F (36.8 °C)-98.9 °F (37.2 °C)] 98.9 °F (37.2 °C)  Pu ondansetron HCl, Metoclopramide HCl, Prochlorperazine Edisylate, diphenhydrAMINE **OR** DiphenhydrAMINE HCl       Assessment/Plan:    60 yo with HTN, OA, who is consulted for medical management s/p L revision THR.       **expected pain  -IV dilaudid prn, o

## 2018-01-10 NOTE — PAYOR COMM NOTE
--------------  ADMISSION REVIEW     Payor: JAK PPO  Subscriber #:  [id number]  Authorization Number: N/A    Admit date: 1/8/18  Admit time: 1634       Admitting Physician: Joyce Martell MD  Attending Physician:  Joyce Martell MD  Primary Care Physician: Walter Laura atenolol 25 MG Oral Tab Take 1 tablet (25 mg total) by mouth daily. Take daily until 1 week after procedure. Wean off qod for 1 week then stop Disp: 30 tablet Rfl: 0   Multiple Vitamin (ONE-DAILY MULTI VITAMINS) Oral Tab Take 1 tablet by mouth daily.  Disp: Alcohol use: Yes           3.6 oz/week     Glasses of wine: 6 per week         REVIEW OF SYSTEMS:   GENERAL HEALTH: feels well otherwise  SKIN: denies any unusual skin lesions or rashes  EYES: no visual complaints or deficits  HEENT: denies nasal congestio Hold supplements and vitamins and any blood thinners 1 week prior to procedure     Medically clearance for  Surgery pending labs and EKG   Consult note faxed to Dr. Darci Holloway.    Thank you for the consult.     Left hip pain  -     OFFICE CONSULTATION,LEVEL III  S · Arrange for an adult family member or friend to drive you home after surgery. If possible, have someone ready to help you at home as you recover.   · Call the surgeon if you get a cold, fever, sore throat, diarrhea, or other health problem just before eleno · You can brush your teeth and rinse your mouth, but don’t swallow any water. Day of surgery  · Don't wear makeup. Don't use perfume, deodorant, or hairspray. Remove nail polish and artificial nails.   · Leave jewelry (including rings), watches, and other Patient’s diagnosis and treatment options were discussed. Conservative care vs surgical intervention including revision total hip replacement discussed. Hospital course,  recovery process, expectations, limitations after surgery explained.  Pros and cons rivaroxaban (XARELTO) tab 10 mg     Date Action Dose Route User    1/10/2018 0609 Given 10 mg Oral Sander Hayes RN      [facility]) tab TABS 17.2 mg     Date Action Dose Route User    1/9/2018 2044 Given 17.2 mg Oral Sander Hayes RN      TraM

## 2018-01-11 NOTE — PAYOR COMM NOTE
--------------  DISCHARGE REVIEW    Payor: JAK CRISTINA  Subscriber #:  [id number]  Authorization Number: [id number]    Admit date: 1/8/18  Admit time:  1634  Discharge Date: 1/10/2018  1:21 PM     Admitting Physician: Marko Simon MD  Attending Physician:  Amirah

## 2018-01-13 NOTE — DISCHARGE SUMMARY
Discharge Summary  Patient ID:  Marc Alcocer  [id number]  61year old  7/12/1960    Admit date: 1/8/2018    Discharge date and time:1/10/18  Attending Physician: No att. providers found     Reason for admission:lfailed left hip resurfacing    Discha MD  1/13/2018  8:42 AM

## 2018-01-26 PROBLEM — Z96.649 S/P REVISION OF TOTAL HIP: Status: ACTIVE | Noted: 2018-01-26

## 2018-02-01 PROBLEM — Z96.642 STATUS POST TOTAL HIP REPLACEMENT, LEFT: Status: ACTIVE | Noted: 2018-02-01

## 2019-01-21 PROCEDURE — 84402 ASSAY OF FREE TESTOSTERONE: CPT | Performed by: INTERNAL MEDICINE

## 2019-01-21 PROCEDURE — 84403 ASSAY OF TOTAL TESTOSTERONE: CPT | Performed by: INTERNAL MEDICINE

## 2019-01-21 PROCEDURE — 82300 ASSAY OF CADMIUM: CPT | Performed by: INTERNAL MEDICINE

## 2019-01-21 PROCEDURE — 83015 HEAVY METAL QUAL ANY NUMBER: CPT | Performed by: INTERNAL MEDICINE

## 2019-03-22 PROBLEM — Z83.71 FAMILY HISTORY OF COLONIC POLYPS: Status: ACTIVE | Noted: 2019-03-22

## 2019-03-22 PROBLEM — Z80.0 FAMILY HISTORY OF COLON CANCER: Status: ACTIVE | Noted: 2019-03-22

## 2019-05-03 PROCEDURE — 88305 TISSUE EXAM BY PATHOLOGIST: CPT | Performed by: INTERNAL MEDICINE

## 2021-05-21 PROBLEM — R35.1 BENIGN PROSTATIC HYPERPLASIA WITH NOCTURIA: Status: ACTIVE | Noted: 2021-05-21

## 2021-05-21 PROBLEM — N40.1 BENIGN PROSTATIC HYPERPLASIA WITH NOCTURIA: Status: ACTIVE | Noted: 2021-05-21

## (undated) DEVICE — SUTURE VICRYL 1 OS-6

## (undated) DEVICE — PILLOW ABDUCTION HIP SM

## (undated) DEVICE — KENDALL SCD EXPRESS SLEEVES, KNEE LENGTH, MEDIUM: Brand: KENDALL SCD

## (undated) DEVICE — LAPAROTOMY SPONGE - RF AND X-RAY DETECTABLE PRE-WASHED: Brand: SITUATE

## (undated) DEVICE — STERILE POLYISOPRENE POWDER-FREE SURGICAL GLOVES: Brand: PROTEXIS

## (undated) DEVICE — DRESSING AQUACEL AG 3.5 X 10

## (undated) DEVICE — BLADE ELECTRODE: Brand: EDGE

## (undated) DEVICE — SPECIMEN CONTAINER,POSITIVE SEAL INDICATOR, OR PACKAGED: Brand: PRECISION

## (undated) DEVICE — Device

## (undated) DEVICE — SUTURE VICRYL 2-0 CP-1

## (undated) DEVICE — SUTURE ETHIBOND 5 CCS

## (undated) DEVICE — MLPD DISPOSABLE PAD (6' ROLL) 3 ROLLS: Brand: SCHAERER MEDICAL USA

## (undated) DEVICE — GOWN SURG AERO CHROME XXL

## (undated) DEVICE — Device: Brand: STABLECUT®

## (undated) DEVICE — INTENDED TO AID IN THE PASSING OF SUTURES THROUGH BONE AND SOFT TISSUE DURING ORTHOPEDIC SURGERY: Brand: HOFFEE SUTURE RETRIEVER

## (undated) DEVICE — HANDLE LIGHT ECONOMY

## (undated) DEVICE — 4.8MM ROUND FAST CUTTING BUR

## (undated) DEVICE — WRAP COOLING HIP W/ICE PILLOW

## (undated) DEVICE — DECANTER BAG 9": Brand: MEDLINE INDUSTRIES, INC.

## (undated) DEVICE — DRAPE CASSETTE X-RAY

## (undated) DEVICE — SOL  .9 1000ML BTL

## (undated) DEVICE — 3M™ STERI-DRAPE™ MEDIUM DRAPE WITH APERTURE 1030: Brand: STERI-DRAPE™

## (undated) DEVICE — 6.0MM ACORN

## (undated) DEVICE — TOTAL HIP CDS: Brand: MEDLINE INDUSTRIES, INC.

## (undated) DEVICE — Device: Brand: PLUMEPEN

## (undated) DEVICE — HOOD, PEEL-AWAY: Brand: FLYTE

## (undated) DEVICE — STERILE HOOK LOCK LATEX FREE ELASTIC BANDAGE 6INX5YD: Brand: HOOK LOCK™

## (undated) DEVICE — CONVERTORS STOCKINETTE: Brand: CONVERTORS

## (undated) NOTE — IP AVS SNAPSHOT
Patient Demographics     Address  [facility]  [place] [address] Phone  [phone] (Home) *Preferred*  [phone] (Work)  [phone] [facility]) E-mail Address  [email or web address]      Emergency Contact(s)     Name Relation Home Work Yoshi Chu any medical issues or to reconcile any change in home medications during hospital admission. Hip Replacement Discharge Instructions    Activity    Bathing  ?  No tub baths, pools, or saunas until cleared by surgeon (about 4-6 weeks because it takes shala dressing is removed (which is about 7 days after surgery). Patient should be standing or lying flat so dressing goes on smoothly. (This dressing needed for hip patients because of location of incision-don’t want contamination from bathroom use)  ?   Contin allowing 30-60 minutes to take effect. ? Do not drink alcohol while on pain medication. ? As you have less discomfort, decrease the amount of pain medication you take. Use plain Tylenol (acetaminophen) for less severe pain. ?  Some pain medications have ? Eat a balanced diet high in fiber and drink plenty of fluids. ? Continue using incentive spirometry because narcotics make you sleepy so you may not take good deep breaths. We do not want you to get pneumonia. Post op Office visits  ?  Schedule 10 ? have unexplained anxiety with breathing       Traveling and Handicapped parking  ? Check with you surgeon when you are allowed to travel so you don’t set yourself up for greater chance of complications.   ? If traveling by car, get out to stretch every 2 [phone]                  Your medication list      TAKE these medications       Instructions Authorizing Provider Morning Afternoon Evening As Needed   aspirin 81 MG Tabs  Notes to patient:  Hold while on xarelto.  Resume once duration of xarelto compl (Or Linked Group #1) 01/09/18 1409 Given      [id number] HYDROcodone-acetaminophen (NORCO)  MG per tab 1 tablet 01/09/18 1829 Given      [id number] HYDROcodone-acetaminophen (NORCO)  MG per tab 1 tablet 01/09/18 2044 Given      [id number] HYDROcod ---------                               -----------         ------                     CBC W/ DIFFERENTIAL[[id number]]          Abnormal            Final result                 Please view results for these tests on the individual orders.     Specimen Inform Browne  Paroxysmal nocturnal dyspnea  Orthopnea  Palpations  Edema     Previous hx of cardiac or vascular issues are HTN, h/o hypertriglyceridemia        Current Outpatient Prescriptions:  atenolol 25 MG Oral Tab Take 1 tablet (25 mg total) by mouth daily.  Ta Social History: Smoking status: Never Smoker                                                               Smokeless tobacco: Never Used                      Alcohol use: Yes           3.6 oz/week     Glasses of wine: 6 per week         REVIEW OF SYSTEMS: Diagnoses and all orders for this visit:     Preop exam for internal medicine  -     OFFICE CONSULTATION,LEVEL III  Hold supplements and vitamins and any blood thinners 1 week prior to procedure     Medically clearance for  Surgery pending labs and EKG   C before surgery. If needed, this blood can be given back (transfused) to you during or after surgery. · Arrange for an adult family member or friend to drive you home after surgery. If possible, have someone ready to help you at home as you recover.   · Dangelo drinking, your procedure may be postponed or rescheduled for another day. This is a safety issue. · You can brush your teeth and rinse your mouth, but don’t swallow any water. Day of surgery  · Don't wear makeup.  Don't use perfume, deodorant, or hairspra Patient’s diagnosis and treatment options were discussed. Conservative care vs surgical intervention including revision total hip replacement discussed. Hospital course,  recovery process, expectations, limitations after surgery explained.  Pros and cons B/l hips, ankles, shoulders   • Vitamin D deficiency     on supplements        PSH:  Past Surgical History:  2016: OTHER SURGICAL HISTORY Right      Comment: ankle surgery injury   2007: [address] Left  No date: SKIN SURGERY      Comment: PEG 3350, magnesium hydroxide, bisacodyl, FLEET ENEMA, ondansetron HCl, Metoclopramide HCl, Prochlorperazine Edisylate, diphenhydrAMINE **OR** DiphenhydrAMINE HCl     ALL:  No Known Allergies     Soc Hx:     Smoking status: Never Smoker    Smokeless tobacc NA  138   K  4.3   CL  104   CO2  27.0   BUN  13   CREATSERUM  1.01   GLU  103*   CA  8.3       No results for input(s): ALT, AST, ALB, AMYLASE, LIPASE, LDH in the last 72 hours.     Invalid input(s): ALPHOS, TBIL, DBIL, TPROT       ASSESSMENT / PLAN:[LM.1] Filed:  1/9/2018  2:18 PM Date of Service:  1/9/2018  2:08 PM Status:  Signed    :  Linda Giang PTA (Physical Therapy Assistant)       PHYSICAL THERAPY HIP TREATMENT NOTE - INPATIENT      Room Number: 351/351-A     Session: 1&2  Number of Vi blankets)?: None   -   Sitting down on and standing up from a chair with arms (e.g., wheelchair, bedside commode, etc.): None   -   Moving from lying on back to sitting on the side of the bed?: None   How much help from another person does the patient curr during mobility. Pt was trained on stairs with CGA for safety. Pt negotiated 12 number of steps with one rail via step to gait pattern to improve safety.      Exercises AM Session PM Session   Ankle Pumps     10 reps 15 reps   Quad Sets 10 reps 15 reps Goal #3  Patient is able to ambulate 300 feet with assistive device at assistance level: modified independent      Goal #4  Patient will negotiate 4 stairs/one curb w/ assistive device and supervision  met   Goal #5  Patient verbalizes and/or demonstrates 2007: REPAIR ROTATOR CUFF,ACUTE Left  No date: SKIN SURGERY      Comment: removal of precancerous skin lesions  12/2010 L 1/2005 R: TOTAL HIP REPLACEMENT Bilateral      Comment: left side resurfaced[SP.2]    HOME SITUATION[SP.1]  Type of Home: [place] [address] -   Moving from lying on back to sitting on the side of the bed?: A Little[SP.2]   How much help from another person does the patient currently need. ..[SP.1]   -   Moving to and from a bed to a chair (including a wheelchair)?: A Little   -   Need to walk i within reach;RN aware of session/findings[SP.2]    ASSESSMENT   Patient is a[SP.3 61year old[SP.2] male admitted on 1/8/2018 for elective revision left ROBERTA.   Pertinent comorbidities and personal factors impacting therapy include h/o right ROBERTA 2011, respattie Patient verbalizes and/or demonstrates all precautions and safety concerns independently   Goal #6        Goal Comments: Goals established on 1/9/2018[SP.1]     Attribution Castillo    SP.1 - Kate Cooks, PT on 1/9/2018  8:48 AM  SP.2 - Kate Cooks, P Stairs to Enter : 2     Stairs to [employer]: 15  Railing: Yes    Lives With: Spouse  Drives: Yes  Patient Owned Equipment: Rolling walker  Patient Regularly Uses: Glasses    Prior Level of [place]: ind pta.   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 Research supports that patients with this level of impairment often benefit from home with home PT.    Base Author:  Loc Matamoros OT Service:  Rehab Author Type:  Occupational Therapist    Filed:  1/9/2018  9:06 AM Date of Service:  1/9/2018  8:55 AM Status:  Signed    :  Loc Matamoros OT (Occupational Therapist)       OCCUPATIONAL THERAPY QU Prior Level of Function: Patient reports independent in all I/ADL and functional mobility without device prior to admission. He is looking forward to returning to hiking.     SUBJECTIVE  \"I'm feeling great\"    OBJECTIVE  Precautions: ROBERTA - posterior  Fall LB dressing techniques/equipment, performed LB dressing to knees SBA via reacher and sock aid with cueing, to waist SBA for balance in standing; education on toileting and toilet transfer techniques, performed with SBA assist to [id number] height toilet with Patient has been evaluated and presents with no skilled Occupational Therapy needs at this time. Patient discharged from Occupational Therapy services. Please re-order if a new functional limitation presents during this admission.     Patient was able to

## (undated) NOTE — IP AVS SNAPSHOT
[address]            (For Outpatient Use Only) Initial Admit Date: 1/8/2018   Inpt/Obs Admit Date: Inpt: 1/8/18 / Obs: N/A   Discharge Date:    Kenneth Ramirez:  [de-identified]   MRN: [de-identified]   CSN: [id number]        ENCOUNTER  Patient C Hospital Account Financial Class: [facility]    January 10, 2018